# Patient Record
Sex: MALE | Race: BLACK OR AFRICAN AMERICAN | NOT HISPANIC OR LATINO | Employment: UNEMPLOYED | URBAN - METROPOLITAN AREA
[De-identification: names, ages, dates, MRNs, and addresses within clinical notes are randomized per-mention and may not be internally consistent; named-entity substitution may affect disease eponyms.]

---

## 2017-06-25 ENCOUNTER — APPOINTMENT (OUTPATIENT)
Dept: GENERAL RADIOLOGY | Facility: CLINIC | Age: 42
End: 2017-06-25
Attending: EMERGENCY MEDICINE
Payer: COMMERCIAL

## 2017-06-25 ENCOUNTER — HOSPITAL ENCOUNTER (EMERGENCY)
Facility: CLINIC | Age: 42
Discharge: HOME | End: 2017-06-25
Attending: EMERGENCY MEDICINE | Admitting: EMERGENCY MEDICINE
Payer: COMMERCIAL

## 2017-06-25 VITALS
BODY MASS INDEX: 25.57 KG/M2 | OXYGEN SATURATION: 100 % | WEIGHT: 210 LBS | RESPIRATION RATE: 20 BRPM | SYSTOLIC BLOOD PRESSURE: 137 MMHG | DIASTOLIC BLOOD PRESSURE: 95 MMHG | HEIGHT: 76 IN

## 2017-06-25 DIAGNOSIS — S43.005A DISLOCATION OF SHOULDER REGION, LEFT, INITIAL ENCOUNTER: ICD-10-CM

## 2017-06-25 PROCEDURE — 99285 EMERGENCY DEPT VISIT HI MDM: CPT | Mod: 25

## 2017-06-25 PROCEDURE — 40000986 XR SHOULDER 2 VIEW LEFT: Mod: LT

## 2017-06-25 PROCEDURE — 99152 MOD SED SAME PHYS/QHP 5/>YRS: CPT

## 2017-06-25 PROCEDURE — 25000125 ZZHC RX 250: Performed by: EMERGENCY MEDICINE

## 2017-06-25 PROCEDURE — 23650 CLTX SHO DSLC W/MNPJ WO ANES: CPT | Mod: LT

## 2017-06-25 PROCEDURE — 25000128 H RX IP 250 OP 636

## 2017-06-25 PROCEDURE — 25000128 H RX IP 250 OP 636: Performed by: EMERGENCY MEDICINE

## 2017-06-25 PROCEDURE — 96374 THER/PROPH/DIAG INJ IV PUSH: CPT

## 2017-06-25 RX ORDER — PROPOFOL 10 MG/ML
100 INJECTION, EMULSION INTRAVENOUS ONCE
Status: COMPLETED | OUTPATIENT
Start: 2017-06-25 | End: 2017-06-25

## 2017-06-25 RX ORDER — PROPOFOL 10 MG/ML
INJECTION, EMULSION INTRAVENOUS
Status: DISCONTINUED
Start: 2017-06-25 | End: 2017-06-25 | Stop reason: HOSPADM

## 2017-06-25 RX ORDER — HYDROMORPHONE HYDROCHLORIDE 1 MG/ML
0.5 INJECTION, SOLUTION INTRAMUSCULAR; INTRAVENOUS; SUBCUTANEOUS
Status: DISCONTINUED | OUTPATIENT
Start: 2017-06-25 | End: 2017-06-25 | Stop reason: HOSPADM

## 2017-06-25 RX ORDER — HYDROCODONE BITARTRATE AND ACETAMINOPHEN 5; 325 MG/1; MG/1
1 TABLET ORAL EVERY 6 HOURS PRN
Qty: 12 TABLET | Refills: 0 | Status: SHIPPED | OUTPATIENT
Start: 2017-06-25

## 2017-06-25 RX ORDER — HYDROMORPHONE HYDROCHLORIDE 1 MG/ML
INJECTION, SOLUTION INTRAMUSCULAR; INTRAVENOUS; SUBCUTANEOUS
Status: DISCONTINUED
Start: 2017-06-25 | End: 2017-06-25 | Stop reason: HOSPADM

## 2017-06-25 RX ADMIN — HYDROMORPHONE HYDROCHLORIDE 0.5 MG: 1 INJECTION, SOLUTION INTRAMUSCULAR; INTRAVENOUS; SUBCUTANEOUS at 12:31

## 2017-06-25 RX ADMIN — HYDROMORPHONE HYDROCHLORIDE 0.5 MG: 1 INJECTION, SOLUTION INTRAMUSCULAR; INTRAVENOUS; SUBCUTANEOUS at 12:35

## 2017-06-25 RX ADMIN — HYDROMORPHONE HYDROCHLORIDE 1 MG: 1 INJECTION, SOLUTION INTRAMUSCULAR; INTRAVENOUS; SUBCUTANEOUS at 12:09

## 2017-06-25 RX ADMIN — PROPOFOL 100 MG: 10 INJECTION, EMULSION INTRAVENOUS at 12:45

## 2017-06-25 RX ADMIN — Medication 1 MG: at 12:09

## 2017-06-25 NOTE — ED AVS SNAPSHOT
Emergency Department    [address] [facility] [address]    Phone:  [phone]    Fax:  [phone]                                       Bishnu Rios   MRN: [id number]    Department:   Emergency Department   Date of Visit:  6/25/2017           Patient Information     Date Of Birth          1975        Your diagnoses for this visit were:     Dislocation of shoulder region, left, initial encounter        You were seen by Shahana Villagomez MD.      Follow-up Information     Follow up with Orthopaedics, [facility]. Schedule an appointment as soon as possible for a visit in 3 days.    Contact information:    [address] [address]  [phone]          Follow up with  Emergency Department.    Specialty:  EMERGENCY MEDICINE    Why:  As needed, If symptoms worsen    Contact information:    [address] [facility] [id number] [phone]        Discharge Instructions         Discharge Instructions  Extremity Injury    You were seen today for an injury to an extremity (arm, hand, leg, or foot). You may have a bruise, strain, or fracture (broken bone).    Return to the Emergency Department or see your regular doctor if your injured area is not back to normal within 5-7 days.    Return to the Emergency Department right away if:    Your pain seems to change or get worse or there is pain in a new area.    Your extremity becomes pale, cool, blue, or numb or tingling past the injury.    You have more drainage, redness or pain in the area of the cut or abrasion.    You have pain that you can t control with the medicine recommended or prescribed here, or you have pain that seems too much for your injury.    Your child will not stop crying or is much more fussy than normal.    You have new symptoms or anything that worries you.    What to Expect:    Your swelling and pain may be worse the day after your injury, but should not be severe and should start getting  better after that. You should not have new symptoms and your pain should not get worse.    You may start to get a bruise over the injured area or below the injured area.    Your movement and strength should get better with time.    Some injuries may not show up until after you have left the Emergency Department so it is important to follow-up with your regular doctor.    Your injury may prevent you from working.  Follow-up with your regular doctor to get a work release note.    Pain medications or your injury may make it unsafe to drive or operate machinery.    Home Care:    Apply ice your injured area for 15 minutes at a time, at least 3 times a day. Use a cloth between the ice bag and your skin to prevent frostbite.     Do not sleep with an ice pack or heating pad on, since this can cause burns or skin injury.    Rest your injured area for at least 1-2 days. After that you may start using your extremity again as long as there is not too much pain.     Raise the injured area above the level of your heart as much as possible in the first 1-2 days.    Use Tylenol  (acetaminophen), Motrin (ibuprofen), or Advil  (ibuprofen) for your pain unless you have an allergy or are told not to use these medications by your doctor.  Take the medications as instructed on the package. Tylenol  (acetaminophen) is in many prescription medicines and non-prescription medicines--check all of your medicines to be sure you aren t taking more than 3000 mg per day.    You may use an elastic bandage (Ace  Wrap) if it makes you more comfortable. Wrap it just tight enough to provide light compression, like a new pair of socks feels. Loosen the bandage if you have swelling past the bandage.      Please follow any other instructions that were discussed with you by your doctor.    MORE INFORMATION:    X-rays:  X-rays done today were read by your doctor but will also be read by a radiologist.  We will contact you if the radiologist sees anything  different on the x-ray.  Your regular doctor may also want to review your x-rays on follow-up.    You could have a fracture (break), even if we told you your x-rays were normal. X-rays are not always certain, and some fractures are hard to see and may not show up right away.  Also, your x-ray may look like you have a fracture, even though you do not.  It is important to follow-up with your regular doctor.     Stretching:  If your injury was to your arm or shoulder and your doctor put you in a sling or an immobilizer, it is important that you take off your immobilizer within 3 days and stretch/move your shoulder, unless your doctor specifically tells you to not move your shoulder.  This is to prevent further injury such as a  frozen shoulder .     If you were given a prescription for medicine here today, be sure to read all of the information (including the package insert) that comes with your prescription.  This will include important information about the medicine, its side effects, and any warnings that you need to know about.  The pharmacist who fills the prescription can provide more information and answer questions you may have about the medicine.  If you have questions or concerns that the pharmacist cannot address, please call or return to the Emergency Department.     Opioid Medication Information    Pain medications are among the most commonly prescribed medicines, so we are including this information for all our patients. If you did not receive pain medication or get a prescription for pain medicine, you can ignore it.     You may have been given a prescription for an opioid (narcotic) pain medicine and/or have received a pain medicine while here in the Emergency Department. These medicines can make you drowsy or impaired. You must not drive, operate dangerous equipment, or engage in any other dangerous activities while taking these medications. If you drive while taking these medications, you could be  arrested for DUI, or driving under the influence. Do not drink any alcohol while you are taking these medications.     Opioid pain medications can cause addiction. If you have a history of chemical dependency of any type, you are at a higher risk of becoming addicted to pain medications.  Only take these prescribed medications to treat your pain when all other options have been tried. Take it for as short a time and as few doses as possible. Store your pain pills in a secure place, as they are frequently stolen and provide a dangerous opportunity for children or visitors in your house to start abusing these powerful medications. We will not replace any lost or stolen medicine.  As soon as your pain is better, you should flush all your remaining medication.     Many prescription pain medications contain Tylenol  (acetaminophen), including Vicodin , Tylenol #3 , Norco , Lortab , and Percocet .  You should not take any extra pills of Tylenol  if you are using these prescription medications or you can get very sick.  Do not ever take more than 3000 mg of acetaminophen in any 24 hour period.    All opioids tend to cause constipation. Drink plenty of water and eat foods that have a lot of fiber, such as fruits, vegetables, prune juice, apple juice and high fiber cereal.  Take a laxative if you don t move your bowels at least every other day. Miralax , Milk of Magnesia, Colace , or Senna  can be used to keep you regular.      Remember that you can always come back to the Emergency Department if you are not able to see your regular doctor in the amount of time listed above, if you get any new symptoms, or if there is anything that worries you.        24 Hour Appointment Hotline       To make an appointment at any [facility], call [phone] ([phone]). If you don't have a family doctor or clinic, we will help you find one. [facility] clinics are conveniently located to serve the needs of you and your  family.             Review of your medicines      START taking        Dose / Directions Last dose taken    HYDROcodone-acetaminophen 5-325 MG per tablet   Commonly known as:  NORCO   Dose:  1 tablet   Quantity:  12 tablet        Take 1 tablet by mouth every 6 hours as needed for moderate to severe pain   Refills:  0                Prescriptions were sent or printed at these locations (1 Prescription)                   Other Prescriptions                Printed at Department/Unit printer (1 of 1)         HYDROcodone-acetaminophen (NORCO) 5-325 MG per tablet                Procedures and tests performed during your visit     XR Shoulder Left 2 Views      Orders Needing Specimen Collection     None      Pending Results     No orders found from 6/23/2017 to 6/26/2017.            Pending Culture Results     No orders found from 6/23/2017 to 6/26/2017.            Pending Results Instructions     If you had any lab results that were not finalized at the time of your Discharge, you can call the ED Lab Result RN at [phone]. You will be contacted by this team for any positive Lab results or changes in treatment. The nurses are available 7 days a week from 10A to 6:30P.  You can leave a message 24 hours per day and they will return your call.        Test Results From Your Hospital Stay              6/25/2017  1:44 PM      Narrative     LEFT SHOULDER 2 VIEWS POSTREDUCTION   6/25/2017 1:27 PM     HISTORY: Reduction of shoulder dislocation.    COMPARISON: None.        Impression     IMPRESSION: Unremarkable examination. No evidence for acute fracture  or dislocation in the left shoulder.    SISI CHERY MD                Clinical Quality Measure: Blood Pressure Screening     Your blood pressure was checked while you were in the emergency department today. The last reading we obtained was  BP: (!) 137/95 . Please read the guidelines below about what these numbers mean and what you should do about them.  If your systolic  "blood pressure (the top number) is less than 120 and your diastolic blood pressure (the bottom number) is less than 80, then your blood pressure is normal. There is nothing more that you need to do about it.  If your systolic blood pressure (the top number) is 120-139 or your diastolic blood pressure (the bottom number) is 80-89, your blood pressure may be higher than it should be. You should have your blood pressure rechecked within a year by a primary care provider.  If your systolic blood pressure (the top number) is 140 or greater or your diastolic blood pressure (the bottom number) is 90 or greater, you may have high blood pressure. High blood pressure is treatable, but if left untreated over time it can put you at risk for heart attack, stroke, or kidney failure. You should have your blood pressure rechecked by a primary care provider within the next 4 weeks.  If your provider in the emergency department today gave you specific instructions to follow-up with your doctor or provider even sooner than that, you should follow that instruction and not wait for up to 4 weeks for your follow-up visit.        Thank you for choosing [facility]       Thank you for choosing [facility] for your care. Our goal is always to provide you with excellent care. Hearing back from our patients is one way we can continue to improve our services. Please take a few minutes to complete the written survey that you may receive in the mail after you visit with us. Thank you!        [employer] Information     [employer] lets you send messages to your doctor, view your test results, renew your prescriptions, schedule appointments and more. To sign up, go to [email or web address] . Click on \"Log in\" on the left side of the screen, which will take you to the Welcome page. Then click on \"Sign up Now\" on the right side of the page.     You will be asked to enter the access code listed below, as well as some personal information. Please follow the " directions to create your username and password.     Your access code is: [email or web address]  Expires: 2017  2:06 PM     Your access code will  in 90 days. If you need help or a new code, please call your [facility] clinic or [phone].        Care EveryWhere ID     This is your Care EveryWhere ID. This could be used by other organizations to access your [facility] medical records  [id number]        Equal Access to Services     LEONA PALMA : Hadii nesha shio Sojulita, waaxda luqadaha, qaybta kaalmada adeegyada, micah huddleston . So [facility] [phone].    ATENCIÓN: Si habla español, tiene a valderrama disposición servicios gratuitos de asistencia lingüística. Llame al [phone].    We comply with applicable federal civil rights laws and [place] laws. We do not discriminate on the basis of race, color, national origin, age, disability sex, sexual orientation or gender identity.            After Visit Summary       This is your record. Keep this with you and show to your community pharmacist(s) and doctor(s) at your next visit.

## 2017-06-25 NOTE — ED PROVIDER NOTES
"  History     Chief Complaint:  Shoulder injury    HPI   Bishnu Rios is a right hand dominant 42 year old male who presents for evaluation of shoulder injury. The patient states that prior to arrival he was playing basketball when he dislocated his left shoulder. This has happened to him several times in the past but has not happened for several years. The patient is currently in severe pain and cannot move his left arm without any pain. He denies any other physical symptoms.    Allergies:  No known drug allergies    Medications:    The patient is currently on no regular medications.    Past Medical History:    History review. No pertinent medical history.    Past Surgical History:    History reviewed. No pertinent surgical history.     Family History:    History reviewed. No pertinent family history.      Social History:  The patient denies tobacco, alcohol, or drug use.        Review of Systems   Musculoskeletal:        + left shoulder pain   All other systems reviewed and are negative.      Physical Exam     Patient Vitals for the past 24 hrs:   BP Heart Rate Resp SpO2 Height Weight   06/25/17 1300 (!) 137/95 - - - - -   06/25/17 1252 (!) 141/98 - - 100 % - -   06/25/17 1247 (!) 134/94 51 20 100 % - -   06/25/17 1241 (!) 144/114 61 17 100 % - -   06/25/17 1239 (!) 126/106 63 22 (!) 89 % - -   06/25/17 1154 - - - - 1.93 m (6' 4\") 95.3 kg (210 lb)      Physical Exam  General: Resting on the gurney, appears very uncomfortable  Head:  The scalp, face, and head appear normal  Mouth/Throat: Mucus membranes are moist  CV:  Regular rate    Normal S1 and S2  No pathological murmur   Resp:  Breath sounds clear and equal bilaterally    Non-labored, no retractions or accessory muscle use    No coarseness    No wheezing   GI:  Abdomen is soft, no rigidity    No tenderness to palpation  MS:  Normal motor assessment of all extremities.    Good capillary refill noted.    Left shoulder deformed with obvious " dislocation  Neuro:  Speech is normal and fluent. No apparent deficit.    Sensation intact in the hand and fingers. The patient is able to give a thumb's up and ok sign and touch his thumb to pinky. He is able to flex and extend fingers and abduct and adduct fingers.  Brisk capillary refill in the hand.    Psych:  Awake. Alert.  Normal affect.      Appropriate interactions.    Emergency Department Course   Imaging:  Shoulder XR G/E 2 views  IMPRESSION: Unremarkable examination. No evidence for acute fracture  or dislocation in the left shoulder.  Report per radiology.    Procedures:      Sedation:      Pre-Procedure Assessment done at 1221.    EXPECTED LEVEL:  Deep Sedation    INDICATION:  Sedation is required to allow for joint reduction    Consent obtained from patient after discussing the risks, benefits and alternatives.    PO INTAKE: Appropriately NPO for procedure    ASA CLASS: Class 1 - HEALTHY PATIENT    MALLAMPATI: Grade 1:  Soft palate, uvula, tonsillar pillars, and posterior pharyngeal wall visible    LUNGS: Lungs Clear with good breath sounds bilaterally.     HEART: Normal heart sounds and rate    History and physical reviewed and no updates needed. I have reviewed the lab findings, diagnostic data, medications, and the plan for sedation. I have determined this patient to be an appropriate candidate for the planned sedation and procedure and have reassessed the patient IMMEDIATELY PRIOR to sedation and procedure.    Sedation Post Procedure Summary:    Prior to the start of the procedure and with procedural staff participation, I verbally confirmed the patient s identity using two indicators, relevant allergies, that the procedure was appropriate and matched the consent or emergent situation, and that the correct equipment/implants were available. Immediately prior to starting the procedure I conducted the Time Out with the procedural staff and re-confirmed the patient s name, procedure, and site/side.  "(The Joint Commission universal protocol was followed.)  Yes      SEDATIVES: Propofol 100mg    Vital signs, airway, End Tidal CO2 and pulse oximetry were monitored and remained stable throughout the procedure and sedation was maintained until the procedure was complete.  The patient was monitored by staff until sedation discharge criteria were met.    PATIENT TOLERANCE: Patient tolerated the procedure well with no immediate complications.    TIME OF SEDATION IN MINUTES:  20 minutes from beginning to end of physician one to one monitoring.      Reduction     SITE: Left Shoulder     CONSENT: Risks (including but not limited to: decreased respirations, oxygen perfusion, aspiration, hypotension), benefits, and alternatives were discussed with patient and consent for procedure was obtained.     MONITORING: Monitoring consisted of heart rate, cardiac monitor, continuous pulse oximetry, continuous capnometry, frequent blood pressure checks, level of consciousness, IV access, constant attendance by RN until patient recovered, constant attendance by MD until patient stable and intubation and emergency airway management equipment available.     REDUCTION COMMENTS: The patient's left shoulder was held in traction and internally and externally rotated until a \"pop\" was felt. The patient's shoulder then appeared reduced with improved alignment. Post reductions X-rays were obtained and showed good reduction.     PATIENT STATUS: Dislocation alignment improved post procedure and both sensation and circulation are intact. Vital signs remained stable, airway patent, and O2 saturations remained above 95%. Post-procedure, the patient was alert and responds to verbal stimuli. Patient was monitored during recovery and returned to pre-procedure baseline.     Interventions:  (1209) Dilaudid 1mg, IV  (1235) Dilaudid 0.5mg, IV    Emergency Department Course:  Past medical records, nursing notes, and vitals reviewed.  Nursing notes and vitals " reviewed.  (1201) I performed an exam of the patient as documented above. I attempted to reduce shoulder without sedation, this was unsuccessful,    (1221) I performed a left shoulder reduction with patient sedated. Procedure results as above.    (1343) The patient was sent for a post reduction x-ray while in the emergency department, findings above.    (1405) Findings and plan explained to the patient and spouse. Patient discharged home with instructions regarding supportive care, medications, and reasons to return. The importance of close follow-up was reviewed. The patient was prescribed hydrocodone-acetaminophen.    Impression & Plan    Medical Decision Making:  Bishnu Rios is a 42 year old male who presented with an acute anterior glenohumeral joint dislocation, confirmed on exam.  There is no evidence as above of neurovascular compromise.  The joint was reduced as above. He was provided a sling after reduction and has good pain relief. Post-reduction x-ray shows no complication.  I discussed the natural history of shoulder dislocation, the possibility of underlying soft tissue injury, and precautions against recurrent dislocation. I have prescribed him oral pain medications and advised him to return for recurrent dislocation, worse pain, numbness, or any other concerns. I recommended orthopedic follow-up in 3-5 days.    Diagnosis:    ICD-10-CM    1. Dislocation of shoulder region, left, initial encounter S43.005A        Disposition:  Patient is discharged to home.    Discharge Medications:  6/25/2017  2:07 PM      START taking these medications    Details   HYDROcodone-acetaminophen (NORCO) 5-325 MG per tablet Take 1 tablet by mouth every 6 hours as needed for moderate to severe pain, Disp-12 tablet, R-0, Local Print               6/25/2017    EMERGENCY DEPARTMENT     Juwan RANKIN, am serving as a scribe on 6/25/2017 at 12:01 PM to personally document services performed by Dr. Villagomez based on my  observations and the provider's statements to me.     Shahana Villagomez MD  06/30/17 0044

## 2017-06-25 NOTE — ED NOTES
Bed: ST02  Expected date:   Expected time:   Means of arrival:   Comments:  Hold for rm 26--reduction

## 2017-06-25 NOTE — ED AVS SNAPSHOT
Emergency Department    [address] [facility] [address]    Phone:  [phone]    Fax:  [phone]                                       Bishnu Rios   MRN: [id number]    Department:   Emergency Department   Date of Visit:  6/25/2017           After Visit Summary Signature Page     I have received my discharge instructions, and my questions have been answered. I have discussed any challenges I see with this plan with the nurse or doctor.    ..........................................................................................................................................  Patient/Patient Representative Signature      ..........................................................................................................................................  Patient Representative Print Name and Relationship to Patient    ..................................................               ................................................  Date                                            Time    ..........................................................................................................................................  Reviewed by Signature/Title    ...................................................              ..............................................  Date                                                            Time

## 2021-04-15 ENCOUNTER — IMMUNIZATION (OUTPATIENT)
Dept: NURSING | Facility: CLINIC | Age: 46
End: 2021-04-15
Payer: COMMERCIAL

## 2021-04-15 PROCEDURE — 91300 PR COVID VAC PFIZER DIL RECON 30 MCG/0.3 ML IM: CPT

## 2021-04-15 PROCEDURE — 0001A PR COVID VAC PFIZER DIL RECON 30 MCG/0.3 ML IM: CPT

## 2021-04-24 ENCOUNTER — HEALTH MAINTENANCE LETTER (OUTPATIENT)
Age: 46
End: 2021-04-24

## 2021-05-06 ENCOUNTER — IMMUNIZATION (OUTPATIENT)
Dept: NURSING | Facility: CLINIC | Age: 46
End: 2021-05-06
Attending: INTERNAL MEDICINE
Payer: COMMERCIAL

## 2021-05-06 PROCEDURE — 91300 PR COVID VAC PFIZER DIL RECON 30 MCG/0.3 ML IM: CPT

## 2021-05-06 PROCEDURE — 0002A PR COVID VAC PFIZER DIL RECON 30 MCG/0.3 ML IM: CPT

## 2021-10-09 ENCOUNTER — HEALTH MAINTENANCE LETTER (OUTPATIENT)
Age: 46
End: 2021-10-09

## 2022-05-21 ENCOUNTER — HEALTH MAINTENANCE LETTER (OUTPATIENT)
Age: 47
End: 2022-05-21

## 2022-09-17 ENCOUNTER — HEALTH MAINTENANCE LETTER (OUTPATIENT)
Age: 47
End: 2022-09-17

## 2023-06-04 ENCOUNTER — HEALTH MAINTENANCE LETTER (OUTPATIENT)
Age: 48
End: 2023-06-04

## 2024-01-20 ENCOUNTER — OFFICE VISIT (OUTPATIENT)
Dept: URGENT CARE | Facility: URGENT CARE | Age: 49
End: 2024-01-20
Payer: COMMERCIAL

## 2024-01-20 ENCOUNTER — HOSPITAL ENCOUNTER (EMERGENCY)
Facility: CLINIC | Age: 49
Discharge: HOME | End: 2024-01-20
Attending: EMERGENCY MEDICINE | Admitting: EMERGENCY MEDICINE
Payer: COMMERCIAL

## 2024-01-20 ENCOUNTER — APPOINTMENT (OUTPATIENT)
Dept: GENERAL RADIOLOGY | Facility: CLINIC | Age: 49
End: 2024-01-20
Attending: EMERGENCY MEDICINE
Payer: COMMERCIAL

## 2024-01-20 VITALS
TEMPERATURE: 97.7 F | HEART RATE: 104 BPM | SYSTOLIC BLOOD PRESSURE: 145 MMHG | OXYGEN SATURATION: 99 % | RESPIRATION RATE: 18 BRPM | DIASTOLIC BLOOD PRESSURE: 92 MMHG

## 2024-01-20 VITALS
OXYGEN SATURATION: 99 % | BODY MASS INDEX: 23.86 KG/M2 | TEMPERATURE: 98.5 F | SYSTOLIC BLOOD PRESSURE: 137 MMHG | HEART RATE: 118 BPM | DIASTOLIC BLOOD PRESSURE: 100 MMHG | WEIGHT: 196 LBS

## 2024-01-20 DIAGNOSIS — R07.89 CHEST TIGHTNESS: ICD-10-CM

## 2024-01-20 DIAGNOSIS — R00.0 TACHYCARDIA: Primary | ICD-10-CM

## 2024-01-20 DIAGNOSIS — F12.90 MARIJUANA SMOKER: ICD-10-CM

## 2024-01-20 DIAGNOSIS — F17.200 SMOKER: ICD-10-CM

## 2024-01-20 DIAGNOSIS — I45.10 RIGHT BUNDLE BRANCH BLOCK: ICD-10-CM

## 2024-01-20 DIAGNOSIS — R07.89 CHEST PRESSURE: ICD-10-CM

## 2024-01-20 LAB
ANION GAP SERPL CALCULATED.3IONS-SCNC: 13 MMOL/L (ref 7–15)
ATRIAL RATE - MUSE: 98 BPM
BASOPHILS # BLD AUTO: 0 10E3/UL (ref 0–0.2)
BASOPHILS NFR BLD AUTO: 0 %
BUN SERPL-MCNC: 15.7 MG/DL (ref 6–20)
CALCIUM SERPL-MCNC: 9.7 MG/DL (ref 8.6–10)
CHLORIDE SERPL-SCNC: 101 MMOL/L (ref 98–107)
CREAT SERPL-MCNC: 0.86 MG/DL (ref 0.67–1.17)
D DIMER PPP FEU-MCNC: 0.34 UG/ML FEU (ref 0–0.5)
DEPRECATED HCO3 PLAS-SCNC: 24 MMOL/L (ref 22–29)
DIASTOLIC BLOOD PRESSURE - MUSE: NORMAL MMHG
EGFRCR SERPLBLD CKD-EPI 2021: >90 ML/MIN/1.73M2
EOSINOPHIL # BLD AUTO: 0 10E3/UL (ref 0–0.7)
EOSINOPHIL NFR BLD AUTO: 0 %
ERYTHROCYTE [DISTWIDTH] IN BLOOD BY AUTOMATED COUNT: 12.3 % (ref 10–15)
GLUCOSE SERPL-MCNC: 93 MG/DL (ref 70–99)
HCT VFR BLD AUTO: 42.1 % (ref 40–53)
HGB BLD-MCNC: 14.3 G/DL (ref 13.3–17.7)
IMM GRANULOCYTES # BLD: 0 10E3/UL
IMM GRANULOCYTES NFR BLD: 0 %
INTERPRETATION ECG - MUSE: NORMAL
LYMPHOCYTES # BLD AUTO: 1.6 10E3/UL (ref 0.8–5.3)
LYMPHOCYTES NFR BLD AUTO: 16 %
MCH RBC QN AUTO: 32.1 PG (ref 26.5–33)
MCHC RBC AUTO-ENTMCNC: 34 G/DL (ref 31.5–36.5)
MCV RBC AUTO: 94 FL (ref 78–100)
MONOCYTES # BLD AUTO: 0.6 10E3/UL (ref 0–1.3)
MONOCYTES NFR BLD AUTO: 6 %
NEUTROPHILS # BLD AUTO: 7.6 10E3/UL (ref 1.6–8.3)
NEUTROPHILS NFR BLD AUTO: 78 %
NRBC # BLD AUTO: 0 10E3/UL
NRBC BLD AUTO-RTO: 0 /100
P AXIS - MUSE: 73 DEGREES
PLATELET # BLD AUTO: 202 10E3/UL (ref 150–450)
POTASSIUM SERPL-SCNC: 4.2 MMOL/L (ref 3.4–5.3)
PR INTERVAL - MUSE: 142 MS
QRS DURATION - MUSE: 140 MS
QT - MUSE: 376 MS
QTC - MUSE: 480 MS
R AXIS - MUSE: 81 DEGREES
RBC # BLD AUTO: 4.46 10E6/UL (ref 4.4–5.9)
SODIUM SERPL-SCNC: 138 MMOL/L (ref 135–145)
SYSTOLIC BLOOD PRESSURE - MUSE: NORMAL MMHG
T AXIS - MUSE: 58 DEGREES
TROPONIN T SERPL HS-MCNC: <6 NG/L
VENTRICULAR RATE- MUSE: 98 BPM
WBC # BLD AUTO: 10 10E3/UL (ref 4–11)

## 2024-01-20 PROCEDURE — 93000 ELECTROCARDIOGRAM COMPLETE: CPT | Performed by: FAMILY MEDICINE

## 2024-01-20 PROCEDURE — 71046 X-RAY EXAM CHEST 2 VIEWS: CPT

## 2024-01-20 PROCEDURE — 99203 OFFICE O/P NEW LOW 30 MIN: CPT | Mod: 25 | Performed by: FAMILY MEDICINE

## 2024-01-20 PROCEDURE — 80048 BASIC METABOLIC PNL TOTAL CA: CPT | Performed by: EMERGENCY MEDICINE

## 2024-01-20 PROCEDURE — 36415 COLL VENOUS BLD VENIPUNCTURE: CPT | Performed by: EMERGENCY MEDICINE

## 2024-01-20 PROCEDURE — 93005 ELECTROCARDIOGRAM TRACING: CPT

## 2024-01-20 PROCEDURE — 85379 FIBRIN DEGRADATION QUANT: CPT | Performed by: EMERGENCY MEDICINE

## 2024-01-20 PROCEDURE — 84484 ASSAY OF TROPONIN QUANT: CPT | Performed by: EMERGENCY MEDICINE

## 2024-01-20 PROCEDURE — 99285 EMERGENCY DEPT VISIT HI MDM: CPT | Mod: 25

## 2024-01-20 PROCEDURE — 85025 COMPLETE CBC W/AUTO DIFF WBC: CPT | Performed by: EMERGENCY MEDICINE

## 2024-01-20 ASSESSMENT — ACTIVITIES OF DAILY LIVING (ADL)
ADLS_ACUITY_SCORE: 35
ADLS_ACUITY_SCORE: 35

## 2024-01-20 NOTE — PROGRESS NOTES
ASSESSMENT/PLAN:      ICD-10-CM    1. Tachycardia  R00.0       2. Chest pressure  R07.89 EKG 12-lead complete w/read - Clinics    mid chest      3. Smoker  F17.200     cigarettes 20 pack years      4. Marijuana smoker  F12.90     last smoked 1 week ago          Patient Instructions     To [facility] ER for further evaluation of your new onset for the past 2 days of tachycardia and chest pressure.     Patient is a 49-year-old male with no known history of cardiac disease or arrhythmias presents to clinic today with 2 day history of chest pressure. Today  noted heart racing-on his Apple Watch monitor heart rate i 140s and felt shortness of breath and pressure in his chest.  Per patient similar symptoms yesterday as well, No history of this in the past.  At present, patient not having shortness of breath or chest pressure .  Mild tachycardia at 110 on exam.      EKG   heart rate 110 sinus rhythm   right bundle branch block,   no chest pressure /pain at time of EKG    Patient smokes cigarettes 1 pack a day for 25 years, smokes marijuana last use 1 week ago, EtOH use within acceptable levels -12 drinks in 7 days, has not suddenly stopped drinking alcohol  Drinks 2 cups of coffee in AM and 1 in the afternoon this has been a long term use    No history of cough, no fever, no wheezing, no headache or changes in vision  Vaccinated for COVID, last COVID 2021 with no complications  no history of hypertension no history of CAD  No family history of CAD         Reviewed medication instructions and side effects. Follow up if experiences side effects.     I reviewed supportive care, otc meds to use if needed, expected course, and signs of concern.  Follow up as needed or if he does not improve within  1-2 days or if worsens in any way.  Reviewed red flag symptoms and is to go to the ER if experiences any of these.     The use of Dragon/Grouper dictation services may have been used to construct the content in this note; any  grammatical or spelling errors are non-intentional. Please contact the author of this note directly if you are in need of any clarification.      On the day of the encounter, time spend on chart review, patient visit, review of testing, documentation was 30 minutes              Patient presents with:  Chest Pain: Tightness x 2 days on left side   Tachycardia: Noticed at home        Subjective     iBshnu Rios is a 49 year old male who presents to clinic today for the following health issues:    HPI     Patient is a 49-year-old male with no known history of cardiac disease or arrhythmias presents to clinic today with 2 day history of chest pressure.   Today  noted heart racing-on his Apple Watch monitor heart rate in 140s and felt shortness of breath and pressure in his chest.  Per patient similar symptoms yesterday as well, No history of this in the past.  At present, patient not having shortness of breath or chest pressure .        Patient smokes cigarettes 1 pack a day for 25 years, smokes marijuana last use 1 week ago,   EtOH use within acceptable levels -12 drinks in 7 days, has not suddenly stopped drinking alcohol  Drinks 2 cups of coffee in AM and 1 in the afternoon- this has been a long term use    No history of cough, no fever, no wheezing, no headache or changes in vision    Vaccinated for COVID, last COVID 2021 with no complications  no history of hypertension   no history of CAD  No family history of CAD/MI      No past medical history on file.  Social History     Tobacco Use    Smoking status: Every Day     Packs/day: .5     Types: Cigarettes    Smokeless tobacco: Never   Substance Use Topics    Alcohol use: No       Current Outpatient Medications   Medication Sig Dispense Refill    HYDROcodone-acetaminophen (NORCO) 5-325 MG per tablet Take 1 tablet by mouth every 6 hours as needed for moderate to severe pain (Patient not taking: Reported on 1/20/2024) 12 tablet 0     No Known  Allergies          ROS are negative, except as otherwise noted HPI      Objective    BP (!) 137/100 (BP Location: Right arm, Patient Position: Sitting, Cuff Size: Adult Large)   Pulse 118   Temp 98.5  F (36.9  C) (Tympanic)   Wt 88.9 kg (196 lb)   SpO2 99%   BMI 23.86 kg/m    Body mass index is 23.86 kg/m .  Physical Exam   GENERAL: alert and no distress  EYES: Eyes grossly normal to inspection, PERRL and conjunctivae and sclerae normal  NECK: no adenopathy, no asymmetry,  RESP: lungs clear to auscultation - no rales, rhonchi or wheezes  CV: tachycardic rate (110)  and  normal rhythm, normal S1 S2, no S3 or S4, no murmur, click or rub,   ABDOMEN: soft, nontender, no hepatosplenomegaly, no masses and bowel sounds normal  MS: no gross musculoskeletal defects noted, no edema  NEURO: Normal strength and tone, mentation intact and speech normal, normal gait      Diagnostic Test Results:             EKG Interpretation:      Interpreted by Lyudmila Johansen MD    Symptoms at time of EKG: palpitations   Rhythm: Normal sinus   Rate: 100-110  Conduction: Normal and Right bundle branch block (complete)  ST Segments/ T Waves: No ST-T wave changes and No acute ischemic changes  Q Waves: None  Comparison to prior: No old EKG available    Clinical Impression: no acute changes and sinus tachycardia

## 2024-01-20 NOTE — PATIENT INSTRUCTIONS
To [facility] ER for further evaluation of your new onset for the past 2 days of tachycardia and chest pressure.     Patient is a 49-year-old male with no known history of cardiac disease or arrhythmias presents to clinic today with 2 day history of chest pressure. Today  noted heart racing-on his Apple Watch monitor heart rate i 140s and felt shortness of breath and pressure in his chest.  Per patient similar symptoms yesterday as well, No history of this in the past.  At present, patient not having shortness of breath or chest pressure .  Mild tachycardia at 110 on exam.      EKG   heart rate 110 sinus rhythm   right bundle branch block,   no chest pressure /pain at time of EKG    Patient smokes cigarettes 1 pack a day for 25 years, smokes marijuana last use 1 week ago, EtOH use within acceptable levels -12 drinks in 7 days, has not suddenly stopped drinking alcohol  Drinks 2 cups of coffee in AM and 1 in the afternoon this has been a long term use    No history of cough, no fever, no wheezing, no headache or changes in vision  Vaccinated for COVID, last COVID 2021 with no complications  no history of hypertension no history of CAD  No family history of CAD

## 2024-01-20 NOTE — ED TRIAGE NOTES
Pt reports palpitations and chest tightness for the fast 2 days.  Pt reports a HR of 140 on a home pulse oximeter today.     Triage Assessment (Adult)       Row Name 01/20/24 3537          Triage Assessment    Airway WDL WDL        Respiratory WDL    Respiratory WDL WDL        Skin Circulation/Temperature WDL    Skin Circulation/Temperature WDL WDL        Cardiac WDL    Cardiac WDL X  see note        Peripheral/Neurovascular WDL    Peripheral Neurovascular WDL WDL        Cognitive/Neuro/Behavioral WDL    Cognitive/Neuro/Behavioral WDL WDL

## 2024-01-20 NOTE — DISCHARGE INSTRUCTIONS
Follow-up with cardiology referral was placed for you.    Follow-up with your primary care doctor    Return to the ER for any worsening concerning symptoms.

## 2024-01-20 NOTE — ED PROVIDER NOTES
History     Chief Complaint:  Palpitations and Chest Pain       HPI   Bishnu Rios is a 49 year old male with no prior medical problems presenting today with palpitations and chest tightness.  He states that over the last 2 days he has had intermittent chest tightness.  He states that he went out of his house today and then when he returned he noted the chest tightness.  He states that since being in the ER the symptoms have resolved.  He states that he has checked his heart rate with his symptoms started earlier today and it was 143.  He states that he has no history of any arrhythmias, A-fib.  No personal or family cardiac history.  He states that he is a daily smoker 15 to 20 cigarettes a day since he was 20 years old.  Reports 9 drinks a week.  No daily drinking.  He does drink 3 cups of coffee daily.  He states he did recently travel to [place] and returned approximately less than a week ago      Independent Historian:   None - Patient Only    Review of External Notes:   Urgent care for tachycardia prior to arrival.  Presented with no shortness of breath or chest pressure mild tachycardia 110.  Right bundle branch block seen on EKG.      Medications:    HYDROcodone-acetaminophen (NORCO) 5-325 MG per tablet        Past Medical History:    History reviewed. No pertinent past medical history.    Past Surgical History:    History reviewed. No pertinent surgical history.     Physical Exam   Patient Vitals for the past 24 hrs:   BP Temp Temp src Pulse Resp SpO2   01/20/24 1608 (!) 145/92 -- -- -- -- --   01/20/24 1336 (!) 150/90 97.7  F (36.5  C) Temporal 104 18 99 %        Physical Exam  Vitals reviewed.   Constitutional:       General: He is not in acute distress.     Appearance: He is not ill-appearing.   HENT:      Head: Normocephalic and atraumatic.   Eyes:      Extraocular Movements: Extraocular movements intact.   Cardiovascular:      Rate and Rhythm: Normal rate and regular rhythm.    Pulmonary:      Effort: Pulmonary effort is normal. No respiratory distress.      Breath sounds: Normal breath sounds. No wheezing.   Abdominal:      Palpations: Abdomen is soft.      Tenderness: There is no abdominal tenderness. There is no guarding.   Musculoskeletal:      Cervical back: Normal range of motion.      Right lower leg: No edema.      Left lower leg: No edema.   Skin:     General: Skin is warm and dry.   Neurological:      Mental Status: He is alert and oriented to person, place, and time.      GCS: GCS eye subscore is 4. GCS verbal subscore is 5. GCS motor subscore is 6.   Psychiatric:         Behavior: Behavior normal.           Emergency Department Course     ECG results from 01/20/24   EKG 12 lead     Value    Systolic Blood Pressure     Diastolic Blood Pressure     Ventricular Rate 98    Atrial Rate 98    SC Interval 142    QRS Duration 140        QTc 480    P Axis 73    R AXIS 81    T Axis 58    Interpretation ECG      Sinus rhythm with sinus arrhythmia  Right bundle branch block  Abnormal ECG  No previous ECGs available  Confirmed by GENERATED REPORT, COMPUTER ([id number]),  Jennifer Tamez ([id number]) on 1/20/2024 3:48:27 PM           Imaging:  XR Chest 2 Views   Final Result   IMPRESSION: Negative chest.             Laboratory:  Labs Ordered and Resulted from Time of ED Arrival to Time of ED Departure   D DIMER QUANTITATIVE - Normal       Result Value    D-Dimer Quantitative 0.34     BASIC METABOLIC PANEL - Normal    Sodium 138      Potassium 4.2      Chloride 101      Carbon Dioxide (CO2) 24      Anion Gap 13      Urea Nitrogen 15.7      Creatinine 0.86      GFR Estimate >90      Calcium 9.7      Glucose 93     TROPONIN T, HIGH SENSITIVITY - Normal    Troponin T, High Sensitivity <6     CBC WITH PLATELETS AND DIFFERENTIAL    WBC Count 10.0      RBC Count 4.46      Hemoglobin 14.3      Hematocrit 42.1      MCV 94      MCH 32.1      MCHC 34.0      RDW 12.3      Platelet Count 202      %  Neutrophils 78      % Lymphocytes 16      % Monocytes 6      % Eosinophils 0      % Basophils 0      % Immature Granulocytes 0      NRBCs per 100 WBC 0      Absolute Neutrophils 7.6      Absolute Lymphocytes 1.6      Absolute Monocytes 0.6      Absolute Eosinophils 0.0      Absolute Basophils 0.0      Absolute Immature Granulocytes 0.0      Absolute NRBCs 0.0          Procedures       Emergency Department Course & Assessments:             Interventions:  Medications - No data to display       ED Course as of 24 1817   Sat 2024   1722 D-Dimer Quantitative: 0.34   1725 X-ray shows no pneumothorax.  No cardiomegaly.  Normal cardiomediastinal.   1734 Patient updated on results.  D-dimer was normal troponin less than 6.  Patient asymptomatic.  Sinus rhythm on cardiac monitor at this time.  I updated patient on results.  Patient stable for discharge at this time.       Social Determinants of Health affecting care:   None    Disposition:  The patient was discharged to home.     Impression & Plan      Medical Decision Makin-year-old male presenting today with chest tightness and palpitations has been intermittent over the last 2 days.  He states that prior to arrival when he returned home he noted symptoms had reoccurred he put on a pulse ox his heart rate was in the 140s.  No history of any arrhythmias.  Seen at urgent care prior to arrival.  Patient then presented here.  At this time he is currently asymptomatic.  His troponin is less than 6.  D-dimer is 0.34.  Chest x-ray showed no cardiomegaly.  EKG showed no ST elevation but did show right bundle branch block.  No old EKGs to compare other than the EKG from today.  Patient remained hemodynamically stable and well-appearing.  Continued to be asymptomatic.  I discussed with him plan for discharge after lengthy discussion regarding his results.  I discussed with him close follow-up with his primary care doctor and cardiology referral.  He is  comfortable with plan of care and comfortable discharge at this time. All questions and concerns addressed at this time.       Diagnosis:    ICD-10-CM    1. Chest tightness  R07.89 Adult Cardiology Eval  Referral      2. Right bundle branch block  I45.10 Adult Cardiology Eval  Referral           Discharge Medications:  Discharge Medication List as of 1/20/2024  5:37 PM             Melody Lyles DO  1/20/2024   Melody Lyles DO Doan, DO Melody  01/20/24 1819

## 2024-02-12 ENCOUNTER — OFFICE VISIT (OUTPATIENT)
Dept: CARDIOLOGY | Facility: CLINIC | Age: 49
End: 2024-02-12
Payer: COMMERCIAL

## 2024-02-12 VITALS
DIASTOLIC BLOOD PRESSURE: 80 MMHG | BODY MASS INDEX: 24.52 KG/M2 | SYSTOLIC BLOOD PRESSURE: 125 MMHG | HEIGHT: 76 IN | WEIGHT: 201.4 LBS | HEART RATE: 85 BPM

## 2024-02-12 DIAGNOSIS — R07.89 CHEST TIGHTNESS: ICD-10-CM

## 2024-02-12 DIAGNOSIS — R94.31 ABNORMAL ELECTROCARDIOGRAM: ICD-10-CM

## 2024-02-12 DIAGNOSIS — R00.2 PALPITATIONS: Primary | ICD-10-CM

## 2024-02-12 DIAGNOSIS — I45.10 RIGHT BUNDLE BRANCH BLOCK: ICD-10-CM

## 2024-02-12 PROCEDURE — 99204 OFFICE O/P NEW MOD 45 MIN: CPT | Performed by: INTERNAL MEDICINE

## 2024-02-12 RX ORDER — METOPROLOL TARTRATE 50 MG
50 TABLET ORAL PRN
Qty: 1 TABLET | Refills: 0 | Status: SHIPPED | OUTPATIENT
Start: 2024-02-12

## 2024-02-12 NOTE — LETTER
2/12/2024    Sachin Ballesteros MD  [address] [place] [address]    RE: Bishnu Rios       Dear Colleague,     I had the pleasure of seeing Bishnu Rios in the [facility].  HPI and Plan:   I had the pleasure of seeing Bishnu Rios in cardiology consultation for chest pain.    He is a pleasant 49-year-old -American male.  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  He has a past medical history of smoking when he smokes about half a pack per day for last 20 years.  Also drinks alcohol.  He has about 9-10 drinks per week which includes both extremes and beer.    In January, he had an episode of palpitations.  This was on January 20.  He was at home and had a sudden onset racing of his heart and palpitations and pounding.  He felt somewhat uncomfortable in his chest related to these symptoms.  There was no associated dizziness or syncope.  He had no shortness of breath.  Because of ongoing racing heart, he went to the urgent care and they did an EKG which showed sinus tachycardia with right bundle branch block.  I reviewed the EKG myself.  Because of underlying age abnormal EKG, was referred to the emergency room.  He was evaluated there and his troponins came back negative.  He was asked to drink fluids and after drinking 2 glass of water, he felt better and his heart rate came down.  It was felt that he may have had dehydration.    However, because of abnormal EKG and some chest discomfort, he was asked to see us in cardiology consultation.    He has no history of hypertension or diabetes.  No family history of premature CAD.    EKG done on 1/20/2024 was reviewed by me.    His lipid profile done in 2023 was reviewed.  His total cholesterol was 215 and LDL was 124.  HDL was 80.  Complete metabolic profile revealed normal potassium and creatinine at that time.  His last TSH is 1.620 2022.  He is scheduled for physical and labs with his primary care provider later this month.    On exam,  regular rate and rhythm.  No murmurs.  Chest was clear auscultation.  No rales or rhonchi.  No peripheral edema.  No carotid bruits.    Impression    1.  Episodic palpitations, his heart rate initially was 140 bpm at home and then came down after hydration.  He could have had dehydration.  When he was having palpitations and elevated heart rate, he did have sinus tachycardia on his EKG.  Arrhythmia appears less likely at this time.  He has had no recurrence since that episode.  At this time, given there is no recurrence, I will hold off on any heart monitor till he has additional episodes.    2.  Abnormal EKG and atypical chest pain  He did have an abnormal EKG with right bundle branch block.  I reviewed the EKG from 2019 done by his primary care provider and that did not show any right bundle branch block.  Given the new changes and some chest discomfort with his palpitations, would recommend a CT coronary angiography to rule out underlying CAD.  We also do an echocardiogram to rule out underlying structural heart disease particularly assess the right sided chambers and rule out any shunting.    Based on the results of the above test, we will make further recommendations.  Thank you for allowing us to personally care of this nice patient.    Sincerely,    Bhavin Siddiqui MD    Today's clinic visit entailed:  Review of prior external note(s) from - Outside records from [facility]  ED notes  Review of the result(s) of each unique test - lipid p[rofile, cbc, ekg  The following tests were independently interpreted by me as noted in my documentation: ekg  Ordering of each unique test    Provider  Link to [facility] Help Grid     The level of medical decision making during this visit was of moderate complexity.      Orders Placed This Encounter   Procedures    Echocardiogram Complete       Orders Placed This Encounter   Medications    metoprolol tartrate (LOPRESSOR) 50 MG tablet     Sig: Take 1 tablet (50 mg) by mouth as needed      "Dispense:  1 tablet     Refill:  0     Take 1 tab on morning of your CT scan test       There are no discontinued medications.      Encounter Diagnoses   Name Primary?    Chest tightness     Right bundle branch block     Palpitations Yes    Abnormal electrocardiogram        CURRENT MEDICATIONS:  Current Outpatient Medications   Medication Sig Dispense Refill    metoprolol tartrate (LOPRESSOR) 50 MG tablet Take 1 tablet (50 mg) by mouth as needed 1 tablet 0    HYDROcodone-acetaminophen (NORCO) 5-325 MG per tablet Take 1 tablet by mouth every 6 hours as needed for moderate to severe pain (Patient not taking: Reported on 1/20/2024) 12 tablet 0       ALLERGIES   No Known Allergies    PAST MEDICAL HISTORY:  History reviewed. No pertinent past medical history.    PAST SURGICAL HISTORY:  History reviewed. No pertinent surgical history.    FAMILY HISTORY:  History reviewed. No pertinent family history.    SOCIAL HISTORY:  Social History     Socioeconomic History    Marital status:      Spouse name: None    Number of children: None    Years of education: None    Highest education level: None   Tobacco Use    Smoking status: Every Day     Packs/day: .5     Types: Cigarettes    Smokeless tobacco: Never   Substance and Sexual Activity    Alcohol use: Yes    Drug use: No       Review of Systems:  Skin:          Eyes:         ENT:         Respiratory:  Negative       Cardiovascular:  Negative      Gastroenterology:        Genitourinary:         Musculoskeletal:         Neurologic:         Psychiatric:         Heme/Lymph/Imm:  Negative      Endocrine:  Negative        Physical Exam:  Vitals: /80   Pulse 85   Ht 1.93 m (6' 4\")   Wt 91.4 kg (201 lb 6.4 oz)   BMI 24.52 kg/m        CC  Melody Lyles DO  [facility]  [address] [place] [address]      Thank you for allowing me to participate in the care of your patient.      Sincerely,     Bhavin Siddiqui MD     [facility] " [facility]

## 2024-02-12 NOTE — PROGRESS NOTES
HPI and Plan:   I had the pleasure of seeing Bishnu Rios in cardiology consultation for chest pain.    He is a pleasant 49-year-old -American male.  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  He has a past medical history of smoking when he smokes about half a pack per day for last 20 years.  Also drinks alcohol.  He has about 9-10 drinks per week which includes both extremes and beer.    In January, he had an episode of palpitations.  This was on January 20.  He was at home and had a sudden onset racing of his heart and palpitations and pounding.  He felt somewhat uncomfortable in his chest related to these symptoms.  There was no associated dizziness or syncope.  He had no shortness of breath.  Because of ongoing racing heart, he went to the urgent care and they did an EKG which showed sinus tachycardia with right bundle branch block.  I reviewed the EKG myself.  Because of underlying age abnormal EKG, was referred to the emergency room.  He was evaluated there and his troponins came back negative.  He was asked to drink fluids and after drinking 2 glass of water, he felt better and his heart rate came down.  It was felt that he may have had dehydration.    However, because of abnormal EKG and some chest discomfort, he was asked to see us in cardiology consultation.    He has no history of hypertension or diabetes.  No family history of premature CAD.    EKG done on 1/20/2024 was reviewed by me.    His lipid profile done in 2023 was reviewed.  His total cholesterol was 215 and LDL was 124.  HDL was 80.  Complete metabolic profile revealed normal potassium and creatinine at that time.  His last TSH is 1.620 2022.  He is scheduled for physical and labs with his primary care provider later this month.    On exam, regular rate and rhythm.  No murmurs.  Chest was clear auscultation.  No rales or rhonchi.  No peripheral edema.  No carotid bruits.    Impression    1.  Episodic palpitations, his heart rate initially was 140 bpm  at home and then came down after hydration.  He could have had dehydration.  When he was having palpitations and elevated heart rate, he did have sinus tachycardia on his EKG.  Arrhythmia appears less likely at this time.  He has had no recurrence since that episode.  At this time, given there is no recurrence, I will hold off on any heart monitor till he has additional episodes.    2.  Abnormal EKG and atypical chest pain  He did have an abnormal EKG with right bundle branch block.  I reviewed the EKG from 2019 done by his primary care provider and that did not show any right bundle branch block.  Given the new changes and some chest discomfort with his palpitations, would recommend a CT coronary angiography to rule out underlying CAD.  We also do an echocardiogram to rule out underlying structural heart disease particularly assess the right sided chambers and rule out any shunting.    Based on the results of the above test, we will make further recommendations.  Thank you for allowing us to personally care of this nice patient.    Sincerely,    Bhavin Siddiqui MD    Today's clinic visit entailed:  Review of prior external note(s) from - Outside records from [facility]  ED notes  Review of the result(s) of each unique test - lipid p[rofile, cbc, ekg  The following tests were independently interpreted by me as noted in my documentation: ekg  Ordering of each unique test    Provider  Link to [facility] Help Grid     The level of medical decision making during this visit was of moderate complexity.      Orders Placed This Encounter   Procedures    Echocardiogram Complete       Orders Placed This Encounter   Medications    metoprolol tartrate (LOPRESSOR) 50 MG tablet     Sig: Take 1 tablet (50 mg) by mouth as needed     Dispense:  1 tablet     Refill:  0     Take 1 tab on morning of your CT scan test       There are no discontinued medications.      Encounter Diagnoses   Name Primary?    Chest tightness     Right bundle branch  "block     Palpitations Yes    Abnormal electrocardiogram        CURRENT MEDICATIONS:  Current Outpatient Medications   Medication Sig Dispense Refill    metoprolol tartrate (LOPRESSOR) 50 MG tablet Take 1 tablet (50 mg) by mouth as needed 1 tablet 0    HYDROcodone-acetaminophen (NORCO) 5-325 MG per tablet Take 1 tablet by mouth every 6 hours as needed for moderate to severe pain (Patient not taking: Reported on 1/20/2024) 12 tablet 0       ALLERGIES   No Known Allergies    PAST MEDICAL HISTORY:  History reviewed. No pertinent past medical history.    PAST SURGICAL HISTORY:  History reviewed. No pertinent surgical history.    FAMILY HISTORY:  History reviewed. No pertinent family history.    SOCIAL HISTORY:  Social History     Socioeconomic History    Marital status:      Spouse name: None    Number of children: None    Years of education: None    Highest education level: None   Tobacco Use    Smoking status: Every Day     Packs/day: .5     Types: Cigarettes    Smokeless tobacco: Never   Substance and Sexual Activity    Alcohol use: Yes    Drug use: No       Review of Systems:  Skin:          Eyes:         ENT:         Respiratory:  Negative       Cardiovascular:  Negative      Gastroenterology:        Genitourinary:         Musculoskeletal:         Neurologic:         Psychiatric:         Heme/Lymph/Imm:  Negative      Endocrine:  Negative        Physical Exam:  Vitals: /80   Pulse 85   Ht 1.93 m (6' 4\")   Wt 91.4 kg (201 lb 6.4 oz)   BMI 24.52 kg/m        CC  Melody Lyles DO  [facility]  [address] [facility]  [place] [id number]                "

## 2024-03-06 ENCOUNTER — HOSPITAL ENCOUNTER (OUTPATIENT)
Dept: CARDIOLOGY | Facility: CLINIC | Age: 49
Discharge: HOME | End: 2024-03-06
Attending: INTERNAL MEDICINE
Payer: COMMERCIAL

## 2024-03-06 ENCOUNTER — TELEPHONE (OUTPATIENT)
Dept: CARDIOLOGY | Facility: CLINIC | Age: 49
End: 2024-03-06

## 2024-03-06 VITALS — DIASTOLIC BLOOD PRESSURE: 95 MMHG | HEART RATE: 81 BPM | SYSTOLIC BLOOD PRESSURE: 147 MMHG

## 2024-03-06 DIAGNOSIS — R07.89 CHEST TIGHTNESS: ICD-10-CM

## 2024-03-06 DIAGNOSIS — I45.10 RIGHT BUNDLE BRANCH BLOCK: ICD-10-CM

## 2024-03-06 DIAGNOSIS — E78.5 HYPERLIPIDEMIA LDL GOAL <100: ICD-10-CM

## 2024-03-06 DIAGNOSIS — R94.31 ABNORMAL ELECTROCARDIOGRAM: ICD-10-CM

## 2024-03-06 DIAGNOSIS — I25.10 CAD (CORONARY ARTERY DISEASE): Primary | ICD-10-CM

## 2024-03-06 LAB — BI-PLANE LVEF ECHO: NORMAL

## 2024-03-06 PROCEDURE — 93306 TTE W/DOPPLER COMPLETE: CPT

## 2024-03-06 PROCEDURE — 75574 CT ANGIO HRT W/3D IMAGE: CPT | Mod: 26 | Performed by: INTERNAL MEDICINE

## 2024-03-06 PROCEDURE — 250N000011 HC RX IP 250 OP 636: Performed by: INTERNAL MEDICINE

## 2024-03-06 PROCEDURE — 93306 TTE W/DOPPLER COMPLETE: CPT | Mod: 26 | Performed by: INTERNAL MEDICINE

## 2024-03-06 PROCEDURE — 75574 CT ANGIO HRT W/3D IMAGE: CPT

## 2024-03-06 PROCEDURE — 250N000013 HC RX MED GY IP 250 OP 250 PS 637: Performed by: INTERNAL MEDICINE

## 2024-03-06 RX ORDER — ONDANSETRON 2 MG/ML
4 INJECTION INTRAMUSCULAR; INTRAVENOUS
Status: DISCONTINUED | OUTPATIENT
Start: 2024-03-06 | End: 2024-03-07 | Stop reason: HOSPADM

## 2024-03-06 RX ORDER — NITROGLYCERIN 0.4 MG/1
0.4 TABLET SUBLINGUAL
Status: DISCONTINUED | OUTPATIENT
Start: 2024-03-06 | End: 2024-03-07 | Stop reason: HOSPADM

## 2024-03-06 RX ORDER — METOPROLOL TARTRATE 1 MG/ML
5-15 INJECTION, SOLUTION INTRAVENOUS
Status: DISCONTINUED | OUTPATIENT
Start: 2024-03-06 | End: 2024-03-07 | Stop reason: HOSPADM

## 2024-03-06 RX ORDER — METOPROLOL TARTRATE 25 MG/1
25-100 TABLET, FILM COATED ORAL
Status: COMPLETED | OUTPATIENT
Start: 2024-03-06 | End: 2024-03-06

## 2024-03-06 RX ORDER — IOPAMIDOL 755 MG/ML
500 INJECTION, SOLUTION INTRAVASCULAR ONCE
Status: COMPLETED | OUTPATIENT
Start: 2024-03-06 | End: 2024-03-06

## 2024-03-06 RX ORDER — DILTIAZEM HCL 60 MG
120 TABLET ORAL
Status: DISCONTINUED | OUTPATIENT
Start: 2024-03-06 | End: 2024-03-07 | Stop reason: HOSPADM

## 2024-03-06 RX ORDER — IVABRADINE 5 MG/1
5-15 TABLET, FILM COATED ORAL
Status: COMPLETED | OUTPATIENT
Start: 2024-03-06 | End: 2024-03-06

## 2024-03-06 RX ORDER — DIPHENHYDRAMINE HCL 25 MG
25 CAPSULE ORAL
Status: DISCONTINUED | OUTPATIENT
Start: 2024-03-06 | End: 2024-03-07 | Stop reason: HOSPADM

## 2024-03-06 RX ORDER — DIPHENHYDRAMINE HYDROCHLORIDE 50 MG/ML
25-50 INJECTION INTRAMUSCULAR; INTRAVENOUS
Status: DISCONTINUED | OUTPATIENT
Start: 2024-03-06 | End: 2024-03-07 | Stop reason: HOSPADM

## 2024-03-06 RX ORDER — METHYLPREDNISOLONE SODIUM SUCCINATE 125 MG/2ML
125 INJECTION, POWDER, LYOPHILIZED, FOR SOLUTION INTRAMUSCULAR; INTRAVENOUS
Status: DISCONTINUED | OUTPATIENT
Start: 2024-03-06 | End: 2024-03-07 | Stop reason: HOSPADM

## 2024-03-06 RX ORDER — DILTIAZEM HYDROCHLORIDE 5 MG/ML
10-15 INJECTION INTRAVENOUS
Status: DISCONTINUED | OUTPATIENT
Start: 2024-03-06 | End: 2024-03-07 | Stop reason: HOSPADM

## 2024-03-06 RX ORDER — ACYCLOVIR 200 MG/1
0-1 CAPSULE ORAL
Status: DISCONTINUED | OUTPATIENT
Start: 2024-03-06 | End: 2024-03-07 | Stop reason: HOSPADM

## 2024-03-06 RX ADMIN — IOPAMIDOL 220 ML: 755 INJECTION, SOLUTION INTRAVENOUS at 12:54

## 2024-03-06 RX ADMIN — METOPROLOL TARTRATE 100 MG: 50 TABLET, FILM COATED ORAL at 10:25

## 2024-03-06 RX ADMIN — IVABRADINE 10 MG: 5 TABLET, FILM COATED ORAL at 10:24

## 2024-03-06 RX ADMIN — NITROGLYCERIN 0.4 MG: 0.4 TABLET SUBLINGUAL at 12:14

## 2024-03-06 NOTE — TELEPHONE ENCOUNTER
CTA:IMPRESSION:     1. Total Agatston score 38.3, placing the patient in the 90th  percentile when compared to age and gender matched control group.     2. Mild Nonobstructive coronary artery disease     3.  Please review Radiology report for incidental noncardiac findings  that  will follow separately.           FINDINGS:     CORONARY CALCIUM SCORE: The total Agatston calcium score is 38.3, Left  main: 0, left anterior descendin.3,  circumflex: 0, right  coronary artery: 0. This places the patient in the 90th percentile  when compared to age and gender matched control group.    Radiology:IMPRESSION: The visualized lungs are clear. Partial visualization of a  large septated and partially calcified splenic cyst measuring at least  11 cm in diameter.         Echo:Interpretation Summary     Normal echocardiogram.     There is no comparison study available.      SHAKIR Timmons RN

## 2024-03-07 RX ORDER — ROSUVASTATIN CALCIUM 10 MG/1
10 TABLET, COATED ORAL DAILY
Qty: 30 TABLET | Refills: 4 | Status: SHIPPED | OUTPATIENT
Start: 2024-03-07 | End: 2024-07-17

## 2024-03-07 NOTE — TELEPHONE ENCOUNTER
He does have evidence of mild coronary artery atherosclerosis which places him in the 90th percentile for age and sex matched controls.  I would suggest initiating rosuvastatin 10 mg/day and repeating a lipid profile and ALT in 3 months and follow-up with Thuy.  At that time, we should also obtain a lipoprotein a.  Echocardiogram was normal ejection fraction.  He also has incidental splenic cyst which she can discuss with his primary care provider.

## 2024-03-07 NOTE — TELEPHONE ENCOUNTER
Called Pt and reviewed Cta and echo and radiology report with Pt, minimal CAD will start Rosuvastain 10 mg 1 po daily. Reviewed side effects, and labs with follow up in 3 months. Echo normal with normal EF, and reviewed radiology report- showing splenic cyst which is partially calcified at 11 cm diameter. Medication sent to pharmacy ,labs and Follow up entered, Pt told to contact PMD for F/U of splenic cyst. SHAKIR Timmons RN

## 2024-05-04 ENCOUNTER — HEALTH MAINTENANCE LETTER (OUTPATIENT)
Age: 49
End: 2024-05-04

## 2024-05-11 ENCOUNTER — APPOINTMENT (OUTPATIENT)
Dept: CT IMAGING | Facility: CLINIC | Age: 49
End: 2024-05-11
Attending: EMERGENCY MEDICINE
Payer: COMMERCIAL

## 2024-05-11 ENCOUNTER — HOSPITAL ENCOUNTER (EMERGENCY)
Facility: CLINIC | Age: 49
Discharge: HOME | End: 2024-05-11
Attending: EMERGENCY MEDICINE | Admitting: EMERGENCY MEDICINE
Payer: COMMERCIAL

## 2024-05-11 VITALS
DIASTOLIC BLOOD PRESSURE: 98 MMHG | WEIGHT: 200 LBS | HEART RATE: 77 BPM | OXYGEN SATURATION: 100 % | SYSTOLIC BLOOD PRESSURE: 148 MMHG | BODY MASS INDEX: 24.36 KG/M2 | RESPIRATION RATE: 18 BRPM | TEMPERATURE: 97.4 F | HEIGHT: 76 IN

## 2024-05-11 DIAGNOSIS — K85.90 ACUTE PANCREATITIS, UNSPECIFIED COMPLICATION STATUS, UNSPECIFIED PANCREATITIS TYPE: ICD-10-CM

## 2024-05-11 LAB
ALBUMIN SERPL BCG-MCNC: 4.1 G/DL (ref 3.5–5.2)
ALP SERPL-CCNC: 65 U/L (ref 40–150)
ALT SERPL W P-5'-P-CCNC: 32 U/L (ref 0–70)
ANION GAP SERPL CALCULATED.3IONS-SCNC: 12 MMOL/L (ref 7–15)
AST SERPL W P-5'-P-CCNC: 42 U/L (ref 0–45)
BASOPHILS # BLD AUTO: 0 10E3/UL (ref 0–0.2)
BASOPHILS NFR BLD AUTO: 0 %
BILIRUB SERPL-MCNC: 0.4 MG/DL
BUN SERPL-MCNC: 12.6 MG/DL (ref 6–20)
CALCIUM SERPL-MCNC: 8.9 MG/DL (ref 8.6–10)
CHLORIDE SERPL-SCNC: 103 MMOL/L (ref 98–107)
CREAT SERPL-MCNC: 0.7 MG/DL (ref 0.67–1.17)
DEPRECATED HCO3 PLAS-SCNC: 23 MMOL/L (ref 22–29)
EGFRCR SERPLBLD CKD-EPI 2021: >90 ML/MIN/1.73M2
EOSINOPHIL # BLD AUTO: 0.1 10E3/UL (ref 0–0.7)
EOSINOPHIL NFR BLD AUTO: 1 %
ERYTHROCYTE [DISTWIDTH] IN BLOOD BY AUTOMATED COUNT: 11.8 % (ref 10–15)
GLUCOSE SERPL-MCNC: 97 MG/DL (ref 70–99)
HCT VFR BLD AUTO: 38.6 % (ref 40–53)
HGB BLD-MCNC: 13.5 G/DL (ref 13.3–17.7)
IMM GRANULOCYTES # BLD: 0 10E3/UL
IMM GRANULOCYTES NFR BLD: 0 %
LIPASE SERPL-CCNC: 91 U/L (ref 13–60)
LYMPHOCYTES # BLD AUTO: 1.2 10E3/UL (ref 0.8–5.3)
LYMPHOCYTES NFR BLD AUTO: 14 %
MCH RBC QN AUTO: 32.6 PG (ref 26.5–33)
MCHC RBC AUTO-ENTMCNC: 35 G/DL (ref 31.5–36.5)
MCV RBC AUTO: 93 FL (ref 78–100)
MONOCYTES # BLD AUTO: 0.6 10E3/UL (ref 0–1.3)
MONOCYTES NFR BLD AUTO: 7 %
NEUTROPHILS # BLD AUTO: 6.8 10E3/UL (ref 1.6–8.3)
NEUTROPHILS NFR BLD AUTO: 78 %
NRBC # BLD AUTO: 0 10E3/UL
NRBC BLD AUTO-RTO: 0 /100
PLATELET # BLD AUTO: 177 10E3/UL (ref 150–450)
POTASSIUM SERPL-SCNC: 3.9 MMOL/L (ref 3.4–5.3)
PROT SERPL-MCNC: 6.9 G/DL (ref 6.4–8.3)
RBC # BLD AUTO: 4.14 10E6/UL (ref 4.4–5.9)
SODIUM SERPL-SCNC: 138 MMOL/L (ref 135–145)
WBC # BLD AUTO: 8.7 10E3/UL (ref 4–11)

## 2024-05-11 PROCEDURE — 250N000011 HC RX IP 250 OP 636: Performed by: EMERGENCY MEDICINE

## 2024-05-11 PROCEDURE — 99285 EMERGENCY DEPT VISIT HI MDM: CPT | Mod: 25

## 2024-05-11 PROCEDURE — 258N000003 HC RX IP 258 OP 636: Performed by: EMERGENCY MEDICINE

## 2024-05-11 PROCEDURE — 96360 HYDRATION IV INFUSION INIT: CPT | Mod: 59

## 2024-05-11 PROCEDURE — 250N000009 HC RX 250: Performed by: EMERGENCY MEDICINE

## 2024-05-11 PROCEDURE — 74177 CT ABD & PELVIS W/CONTRAST: CPT

## 2024-05-11 PROCEDURE — 83690 ASSAY OF LIPASE: CPT | Performed by: EMERGENCY MEDICINE

## 2024-05-11 PROCEDURE — 85025 COMPLETE CBC W/AUTO DIFF WBC: CPT | Performed by: EMERGENCY MEDICINE

## 2024-05-11 PROCEDURE — 80053 COMPREHEN METABOLIC PANEL: CPT | Performed by: EMERGENCY MEDICINE

## 2024-05-11 PROCEDURE — 36415 COLL VENOUS BLD VENIPUNCTURE: CPT | Performed by: EMERGENCY MEDICINE

## 2024-05-11 RX ORDER — IOPAMIDOL 755 MG/ML
101 INJECTION, SOLUTION INTRAVASCULAR ONCE
Status: COMPLETED | OUTPATIENT
Start: 2024-05-11 | End: 2024-05-11

## 2024-05-11 RX ADMIN — SODIUM CHLORIDE 69 ML: 9 INJECTION, SOLUTION INTRAVENOUS at 12:06

## 2024-05-11 RX ADMIN — SODIUM CHLORIDE, POTASSIUM CHLORIDE, SODIUM LACTATE AND CALCIUM CHLORIDE 1000 ML: 600; 310; 30; 20 INJECTION, SOLUTION INTRAVENOUS at 10:16

## 2024-05-11 RX ADMIN — IOPAMIDOL 101 ML: 755 INJECTION, SOLUTION INTRAVENOUS at 12:05

## 2024-05-11 ASSESSMENT — COLUMBIA-SUICIDE SEVERITY RATING SCALE - C-SSRS
2. HAVE YOU ACTUALLY HAD ANY THOUGHTS OF KILLING YOURSELF IN THE PAST MONTH?: NO
1. IN THE PAST MONTH, HAVE YOU WISHED YOU WERE DEAD OR WISHED YOU COULD GO TO SLEEP AND NOT WAKE UP?: NO
6. HAVE YOU EVER DONE ANYTHING, STARTED TO DO ANYTHING, OR PREPARED TO DO ANYTHING TO END YOUR LIFE?: NO

## 2024-05-11 ASSESSMENT — ACTIVITIES OF DAILY LIVING (ADL)
ADLS_ACUITY_SCORE: 35
ADLS_ACUITY_SCORE: 33
ADLS_ACUITY_SCORE: 35
ADLS_ACUITY_SCORE: 35

## 2024-05-11 NOTE — ED TRIAGE NOTES
Pt reports getting a call from his doctor this morning stating he needed to go to ED for abnormal labs. Elevated lipase in mychart. Pt also reporting epigastric abd pain since Tuesday. Denies n/v/d.      Triage Assessment (Adult)       Row Name 05/11/24 0939          Triage Assessment    Airway WDL WDL        Respiratory WDL    Respiratory WDL WDL        Skin Circulation/Temperature WDL    Skin Circulation/Temperature WDL WDL        Cardiac WDL    Cardiac WDL WDL        Peripheral/Neurovascular WDL    Peripheral Neurovascular WDL WDL        Cognitive/Neuro/Behavioral WDL    Cognitive/Neuro/Behavioral WDL WDL

## 2024-05-11 NOTE — DISCHARGE INSTRUCTIONS
I recommend you avoid alcohol as it is likely causing you to have spells of pancreatitis.  Stick with an easy diet and I anticipate your symptoms should improve over these next several days.  Follow-up with your primary team.  Return to the ER for worsening of pain or any other emergent concerns.

## 2024-05-11 NOTE — ED PROVIDER NOTES
"  Emergency Department Note      History of Present Illness     Chief Complaint  Abdominal Pain and Abnormal Labs    HPI  Bishnu Rios is a 49 year old male with a history of coronary artery disease and hypercholesteremia who presents with abdominal pain and abnormal labs. He first noticed  a dull, pressure-like epigastric abdominal when he woke up 4 days ago. Nothing appeared to have alleviated or exacerbated the pain, and it did not radiate anywhere else in the body.  Another symptom during this time frame was some diarrhea and decreased oral intake. He was initially seen at his primary care's office yesterday where he had some blood work done. Then earlier this morning, he received a call from his provider informing him that his lipase was elevated and that he should go to the ED for possible pancreatitis. He still has the abdominal pain at bedside, and rates it a 3/4-10. He has no prior abdominal surgeries, so he still has his gallbladder. He endorses smoking, and will usually only have a couple alcoholic drinks a week. There is no family history of pancreatic cancer. He denies nausea, vomiting, chest pain, shortness of breath, fever, rhinorrhea, congestion, sore throat, dysuria, hematuria, melena, or hematochezia.  He is tolerating p.o.,  But has not eaten as much due to his pain.    Independent Historian  None    Review of External Notes  Office Visit note from Yesterday.     Past Medical History   Medical History and Problem List  RBBB   Hypercholesteremia   CAD   Adenomatous polyp of colon   Tobacco use disorder    Medications  Norco   Lopressor   Crestor     Surgical History   The patient denies pertinent past surgical history.     Physical Exam   Patient Vitals for the past 24 hrs:   BP Temp Temp src Pulse Resp SpO2 Height Weight   05/11/24 1022 (!) 146/98 -- -- 71 -- 92 % -- --   05/11/24 0940 (!) 143/93 97.4  F (36.3  C) Temporal 111 18 98 % 1.93 m (6' 4\") 90.7 kg (200 lb)     Physical " Exam  Constitutional:       General: Not in acute distress.        Appearance: Normal appearance.   HENT:      Head: Normocephalic and atraumatic.   Eyes:      Extraocular Movements: Extraocular movements intact.      Conjunctiva/sclera: Conjunctivae normal.   Cardiovascular:      Rate and Rhythm: Normal rate and regular rhythm.   Pulmonary:      Effort: Pulmonary effort is normal. No respiratory distress.      Breath sounds: Normal breath sounds.   Abdominal:      General: Abdomen is flat. There is no distension.      Palpations: Abdomen is soft.      Tenderness: There is epigastric and right upper quadrant abdominal tenderness to palpation.   Musculoskeletal:      Cervical back: Normal range of motion. No rigidity.      Right lower leg: No edema.      Left lower leg: No edema.   Skin:     General: Skin is warm and dry.   Neurological:      General: No focal deficit present.      Mental Status: Alert and oriented to person, place, and time.   Psychiatric:         Mood and Affect: Mood normal.         Behavior: Behavior normal.    Epigastric abdominal pain to palpation     Diagnostics   Lab Results   Labs Ordered and Resulted from Time of ED Arrival to Time of ED Departure   LIPASE - Abnormal       Result Value    Lipase 91 (*)    CBC WITH PLATELETS AND DIFFERENTIAL - Abnormal    WBC Count 8.7      RBC Count 4.14 (*)     Hemoglobin 13.5      Hematocrit 38.6 (*)     MCV 93      MCH 32.6      MCHC 35.0      RDW 11.8      Platelet Count 177      % Neutrophils 78      % Lymphocytes 14      % Monocytes 7      % Eosinophils 1      % Basophils 0      % Immature Granulocytes 0      NRBCs per 100 WBC 0      Absolute Neutrophils 6.8      Absolute Lymphocytes 1.2      Absolute Monocytes 0.6      Absolute Eosinophils 0.1      Absolute Basophils 0.0      Absolute Immature Granulocytes 0.0      Absolute NRBCs 0.0     COMPREHENSIVE METABOLIC PANEL - Normal    Sodium 138      Potassium 3.9      Carbon Dioxide (CO2) 23      Anion  Gap 12      Urea Nitrogen 12.6      Creatinine 0.70      GFR Estimate >90      Calcium 8.9      Chloride 103      Glucose 97      Alkaline Phosphatase 65      AST 42      ALT 32      Protein Total 6.9      Albumin 4.1      Bilirubin Total 0.4       Imaging  CT Abdomen Pelvis w Contrast   Final Result   IMPRESSION:    1.  No acute findings in the abdomen or pelvis.      2.  Multilocular peripherally calcified simple cyst in the spleen.       3.  Tiny low-attenuation lesions in the right hepatic lobe are too small to characterize. In a low-risk patient, an incidental hepatic lesion <1 cm generally does not require further workup and can be considered benign (*Low-risk patients: No known    malignancy, hepatic dysfunction, or hepatic risk factors).         Results per radiology     Independent Interpretation  None     ED Course    Medications Administered  Medications   lactated ringers BOLUS 1,000 mL (0 mLs Intravenous Stopped 5/11/24 1119)   iopamidol (ISOVUE-370) solution 101 mL (101 mLs Intravenous $Given 5/11/24 1205)   sodium chloride 0.9 % bag 500mL for CT scan flush use (69 mLs Intravenous $Given 5/11/24 1206)     Procedures  None     Discussion of Management  None    Social Determinants of Health adding to complexity of care  None    ED Course  ED Course as of 05/11/24 1307   Sat May 11, 2024   0946 I obtained history and examined the patient as noted above.     Medical Decision Making / Diagnosis   CMS Diagnoses: None    MIPS     None    MDM  Bishnu HENOK Rios is a 49 year old male as described above presents to the emergency department for epigastric abdominal pain and suspected pancreatitis.  Patient hemodynamically stable at time of evaluation.  Afebrile.  Lipase level obtained yesterday 198 at outpatient lab facility.  On examination, patient does have epigastric abdominal tenderness in addition to right upper quadrant abdominal tenderness.  Will repeat abdominal pain lab work for evaluation for abnormal  liver function testing and trend lipase level.  Patient symptoms otherwise fairly mild per the patient.  Given ongoing pain and associated right upper quadrant pain as well, will obtain CT and pelvis with contrast for evaluation for biliary obstruction, cholecystitis, and other etiology including, but not limited to, infectious colitis, diverticulitis, bowel obstruction, and abdominal aortic aneurysm/dissection. Discussed care plan with patient who voiced understanding and agreement with plan.  Answered all questions.  Additional workup and orders as listed in chart.    Please refer to ED course above as part of continuation of MDM for details on the patient's treatment course and any changes or updates in care plan beyond my initial evaluation and MDM creation.    Disposition  Patient signed out to my colleague Dr. Trierweiler pending CT and pelvis with contrast.    ICD-10 Codes:    ICD-10-CM    1. Acute pancreatitis, unspecified complication status, unspecified pancreatitis type  K85.90     Mild and improving           Discharge Medications  New Prescriptions    No medications on file       Scribe Disclosure:  Leif RANKIN, am serving as a scribe at 10:29 AM on 5/11/2024 to document services personally performed by Mejia Aden DO based on my observations and the provider's statements to me.     [employer]      Mejia Aden DO  05/11/24 2487

## 2024-05-11 NOTE — ED PROVIDER NOTES
This patient was signed out by  Pending CT results. [facility] CT is completely unremarkable.  Lipase is trending downward.  History and physical are consistent with pancreatitis, likely secondary to alcohol use.  I counseled him on this and answered his questions.  He is safe for discharge home with outpatient follow-up.  He will return to us for worsening of condition or any other emergent concerns.     Trierweiler, Chad A, MD  05/11/24 5405

## 2024-07-17 DIAGNOSIS — E78.5 HYPERLIPIDEMIA LDL GOAL <100: ICD-10-CM

## 2024-07-17 DIAGNOSIS — I25.10 CAD (CORONARY ARTERY DISEASE): ICD-10-CM

## 2024-07-17 RX ORDER — ROSUVASTATIN CALCIUM 10 MG/1
10 TABLET, COATED ORAL DAILY
Qty: 30 TABLET | Refills: 7 | Status: SHIPPED | OUTPATIENT
Start: 2024-07-17

## 2025-05-17 ENCOUNTER — HEALTH MAINTENANCE LETTER (OUTPATIENT)
Age: 50
End: 2025-05-17

## (undated) RX ORDER — METOPROLOL TARTRATE 50 MG
TABLET ORAL
Status: DISPENSED
Start: 2024-03-06

## (undated) RX ORDER — IVABRADINE 5 MG/1
TABLET, FILM COATED ORAL
Status: DISPENSED
Start: 2024-03-06